# Patient Record
Sex: MALE | Race: WHITE
[De-identification: names, ages, dates, MRNs, and addresses within clinical notes are randomized per-mention and may not be internally consistent; named-entity substitution may affect disease eponyms.]

---

## 2019-06-10 ENCOUNTER — HOSPITAL ENCOUNTER (INPATIENT)
Dept: HOSPITAL 97 - ER | Age: 34
LOS: 2 days | Discharge: LEFT BEFORE DISCHARGE | DRG: 918 | End: 2019-06-12
Attending: FAMILY MEDICINE | Admitting: HOSPITALIST
Payer: SELF-PAY

## 2019-06-10 DIAGNOSIS — R51: ICD-10-CM

## 2019-06-10 DIAGNOSIS — R45.851: ICD-10-CM

## 2019-06-10 DIAGNOSIS — T42.1X2A: Primary | ICD-10-CM

## 2019-06-10 DIAGNOSIS — Z91.81: ICD-10-CM

## 2019-06-10 DIAGNOSIS — F20.9: ICD-10-CM

## 2019-06-10 DIAGNOSIS — Z91.14: ICD-10-CM

## 2019-06-10 DIAGNOSIS — F17.210: ICD-10-CM

## 2019-06-10 DIAGNOSIS — K21.9: ICD-10-CM
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
## 2019-06-10 LAB
ALBUMIN SERPL BCP-MCNC: 4.2 G/DL (ref 3.4–5)
ALP SERPL-CCNC: 65 U/L (ref 45–117)
ALT SERPL W P-5'-P-CCNC: 24 U/L (ref 12–78)
AST SERPL W P-5'-P-CCNC: 13 U/L (ref 15–37)
BUN BLD-MCNC: 17 MG/DL (ref 7–18)
GLUCOSE SERPLBLD-MCNC: 146 MG/DL (ref 74–106)
HCT VFR BLD CALC: 49.7 % (ref 39.6–49)
INR BLD: 1.04
LYMPHOCYTES # SPEC AUTO: 1.7 K/UL (ref 0.7–4.9)
METHAMPHET UR QL SCN: NEGATIVE
PMV BLD: 8.4 FL (ref 7.6–11.3)
POTASSIUM SERPL-SCNC: 3.9 MMOL/L (ref 3.5–5.1)
RBC # BLD: 5.96 M/UL (ref 4.33–5.43)
THC SERPL-MCNC: NEGATIVE NG/ML

## 2019-06-10 PROCEDURE — 99285 EMERGENCY DEPT VISIT HI MDM: CPT

## 2019-06-10 PROCEDURE — 80053 COMPREHEN METABOLIC PANEL: CPT

## 2019-06-10 PROCEDURE — 80076 HEPATIC FUNCTION PANEL: CPT

## 2019-06-10 PROCEDURE — 80156 ASSAY CARBAMAZEPINE TOTAL: CPT

## 2019-06-10 PROCEDURE — 85025 COMPLETE CBC W/AUTO DIFF WBC: CPT

## 2019-06-10 PROCEDURE — 84100 ASSAY OF PHOSPHORUS: CPT

## 2019-06-10 PROCEDURE — 96361 HYDRATE IV INFUSION ADD-ON: CPT

## 2019-06-10 PROCEDURE — 70450 CT HEAD/BRAIN W/O DYE: CPT

## 2019-06-10 PROCEDURE — 85730 THROMBOPLASTIN TIME PARTIAL: CPT

## 2019-06-10 PROCEDURE — 80320 DRUG SCREEN QUANTALCOHOLS: CPT

## 2019-06-10 PROCEDURE — 97116 GAIT TRAINING THERAPY: CPT

## 2019-06-10 PROCEDURE — 36415 COLL VENOUS BLD VENIPUNCTURE: CPT

## 2019-06-10 PROCEDURE — 80307 DRUG TEST PRSMV CHEM ANLYZR: CPT

## 2019-06-10 PROCEDURE — 80074 ACUTE HEPATITIS PANEL: CPT

## 2019-06-10 PROCEDURE — 85610 PROTHROMBIN TIME: CPT

## 2019-06-10 PROCEDURE — 72125 CT NECK SPINE W/O DYE: CPT

## 2019-06-10 PROCEDURE — 93005 ELECTROCARDIOGRAM TRACING: CPT

## 2019-06-10 PROCEDURE — 80048 BASIC METABOLIC PNL TOTAL CA: CPT

## 2019-06-10 PROCEDURE — 97161 PT EVAL LOW COMPLEX 20 MIN: CPT

## 2019-06-10 PROCEDURE — 81003 URINALYSIS AUTO W/O SCOPE: CPT

## 2019-06-10 PROCEDURE — 83735 ASSAY OF MAGNESIUM: CPT

## 2019-06-10 PROCEDURE — 87389 HIV-1 AG W/HIV-1&-2 AB AG IA: CPT

## 2019-06-10 PROCEDURE — 87522 HEPATITIS C REVRS TRNSCRPJ: CPT

## 2019-06-10 PROCEDURE — 80329 ANALGESICS NON-OPIOID 1 OR 2: CPT

## 2019-06-10 PROCEDURE — 97530 THERAPEUTIC ACTIVITIES: CPT

## 2019-06-10 PROCEDURE — 96374 THER/PROPH/DIAG INJ IV PUSH: CPT

## 2019-06-10 RX ADMIN — ACETAMINOPHEN PRN MG: 500 TABLET, FILM COATED ORAL at 19:29

## 2019-06-10 RX ADMIN — Medication SCH ML: at 21:00

## 2019-06-10 RX ADMIN — Medication SCH ML: at 10:04

## 2019-06-10 RX ADMIN — ENOXAPARIN SODIUM SCH MG: 40 INJECTION SUBCUTANEOUS at 10:03

## 2019-06-10 RX ADMIN — SODIUM CHLORIDE SCH: 0.45 INJECTION, SOLUTION INTRAVENOUS at 21:00

## 2019-06-10 RX ADMIN — SODIUM CHLORIDE SCH MLS: 0.45 INJECTION, SOLUTION INTRAVENOUS at 17:49

## 2019-06-10 NOTE — RAD REPORT
EXAM DESCRIPTION:  CT - Head Brain Wo Cont - 6/10/2019 6:14 am

 

CLINICAL HISTORY:  The patient is 33 years old and is Male; AMS. drug OD

 

TECHNIQUE:  Axial computed tomography images of the head/brain without intravenous contrast.   Sagitt
al and coronal reformatted images were created and reviewed.   This CT exam was performed using one o
r more of the following dose reduction techniques:   automated exposure control, adjustment of the mA
 and/or kV according to patient size, and/or use of iterative reconstruction technique.

 

COMPARISON:  No relevant prior studies available.

 

FINDINGS:  BRAIN:   Unremarkable.   The gray-white matter differentiation is preserved . No hemorrhag
e.   No significant white matter disease.   No edema. No extra-axial fluid collections.

   VENTRICLES:   Unremarkable.   No ventriculomegaly.

   BONES/JOINTS:   No acute fracture.

   SOFT TISSUES:   Unremarkable.

   SINUSES:   Unremarkable as visualized.   No acute sinusitis.

   MASTOID AIR CELLS:   Unremarkable as visualized.   No mastoid effusion.

 

IMPRESSION:  No acute intracranial findings.

 

Electronically signed by:   Clemencia Burton MD   6/10/2019 3:36 AM CDT Workstation: [id number]

 

 

Due to temporary technical issues with the PACS/Fluency reporting system, reports are being signed by
 the in house radiologist as a courtesy to ensure prompt reporting. The interpreting radiologist is f
ully responsible for the content of the report.

## 2019-06-10 NOTE — P.HP
Certification for Inpatient


Patient admitted to: Inpatient


With expected LOS: >2 Midnights


Patient will require the following post-hospital care: None


Practitioner: I am a practitioner with admitting privileges, knowledge of 

patient current condition, hospital course, and medical plan of care.


Services: Services provided to patient in accordance with Admission 

requirements found in Title 42 Section 412.3 of the Code of Federal Regulations





Patient History


Date of Service: 06/10/19


Reason for admission: Suicidal ideations


History of Present Illness: 





Patient is a 33-year-old gentleman who has been upset because apparently he got 

into an argument with his girlfriend.  Patient apparently took 43 Tegretol pills

, and he apparently was trying to hurt himself.  Patient at this time is not 

that communicative.  He does not rule a lot of information and he is lethargic.

  He is arousable.  But even when he arouses he just as for risk friend to come 

and talk to him.  Will admit him to the hospital for monitoring of his Tegretol 

level.  Will also needs psychiatric evaluation for inpatient psychiatry.





- Past Medical/Surgical History


Past Medical History: Unable to obtain


Past Surgical History: Unable to obtain





- Family History


  ** Father


Family History: Reviewed- Non-Contributory





- Social History


Smoking Status: Current some day smoker


Alcohol use: Yes


CD- Drugs: Yes





Review of Systems


10-point ROS is otherwise unremarkable





Physical Examination





- Vital Signs


Temperature: 99 F


Blood Pressure: 130/80


Pulse: 78


Respirations: 18


Pulse Ox (%): 99





- Physical Exam


General: Alert, Other (Lethargic)


HEENT: Atraumatic, PERRLA, Mucous membr. moist/pink, EOMI, Sclerae nonicteric


Neck: Supple, 2+ carotid pulse no bruit, No LAD, Without JVD or thyroid 

abnormality


Respiratory: Clear to auscultation bilaterally, Normal air movement


Cardiovascular: Regular rate/rhythm, Normal S1 S2, No murmurs


Gastrointestinal: Normal bowel sounds, Soft and benign, Non-distended, No 

tenderness


Musculoskeletal: No clubbing, No swelling, No tenderness


Integumentary: No rashes


Neurological: Normal speech, Normal tone, Sensation intact, Cranial nerves 3-12 

intact, Normal affect, Abnormal gait, Abnormal strength


Lymphatics: No axilla or inguinal lymphadenopathy





- Studies


Laboratory Data (last 24 hrs)





06/10/19 02:10: PT 12.2, INR 1.04, APTT 27.7


06/10/19 02:10: WBC 13.5 H, Hgb 16.7, Hct 49.7 H, Plt Count 351


06/10/19 02:10: Sodium 141, Potassium 3.9, BUN 17, Creatinine 1.17, Glucose 146 

H, Total Bilirubin 0.4, AST 13 L, ALT 24, Alkaline Phosphatase 65








Assessment & Plan





- Problems (Diagnosis)


(1) Suicidal ideation


Current Visit: Yes   Status: Acute   





(2) Tegretol toxicity


Current Visit: Yes   Status: Acute   





(3) Overdose of anticonvulsant


Current Visit: Yes   Status: Acute   





- Plan





PLAN:


1. Aggressive hydration


2. Suicidal precautions


3. Monitor electrolytes


4. Tegretol toxicity-check levels daily


5. Repeat labs in the evening


6. GI/DVT prophylaxis


Discharge Plan: Psychiatry


Plan to discharge in: Greater than 2 days





- Advance Directives


Does patient have a Living Will: No


Does patient have a Durable POA for Healthcare: No





- Code Status/Comfort Care


Code Status Assessed: Yes


Code Status: Full Code


Critical Care: No


Time Spent Managing PTS Care (In Minutes): 45

## 2019-06-10 NOTE — ER
Nurse's Notes                                                                                     

 [facility]                                                                 

Name: David Chery Sr                                                                              

Age: 33 yrs                                                                                       

Sex: Male                                                                                         

: 1985                                                                                   

MRN: [id number]                                                                                   

Arrival Date: 06/10/2019                                                                          

Time: 01:44                                                                                       

Account#: [id number]                                                                            

Bed 5                                                                                             

Private MD:                                                                                       

Diagnosis: Acute intentional drug overdose;Altered mental status due to tegretol overdose         

                                                                                                  

Presentation:                                                                                     

06/10                                                                                             

01:45 Presenting complaint: EMS states: Reports pt family called EMS, they reported he took   ea  

      approximately 43 carbamazepine pills. EMS report pt is A and O x 4 but unsteady on his      

      feet and groggy. Pt reports he took an unknown amount of carbamazepine pills Saturday       

      morning and started feeling "buzzed",  morning. Pt states "tonight I couldn't         

      even walk". Transition of care: patient was not received from another setting of care.      

      Onset of symptoms was Justina 10, 2019. Risk Assessment: Do you want to hurt yourself or       

      someone else? Patient reports no desire to harm self or others. Other: Pt reports he is     

      not sure if he was trying to hurt himself. Initial Sepsis Screen: Does the patient meet     

      any 2 criteria?. Initial Sepsis Screen: Does the patient have a suspected source of         

      infection? No. Patient's initial sepsis screen is negative. Care prior to arrival: None.    

01:45 Method Of Arrival: EMS: [facility]                                                         

01:45 Acuity: CHLOE 2                                                                           ea  

                                                                                                  

Historical:                                                                                       

- Allergies:                                                                                      

04:06 No Known Allergies;                                                                     ea  

- PMHx:                                                                                           

04:06 None;                                                                                   ea  

- PSHx:                                                                                           

04:06 None;                                                                                   ea  

                                                                                                  

- Immunization history:: Adult Immunizations up to date.                                          

- Social history:: Smoking status: unknown.                                                       

- Ebola Screening: : No symptoms or risks identified at this time.                                

                                                                                                  

                                                                                                  

Screenin:58 Abuse screen: Denies threats or abuse. Nutritional screening: No deficits noted.        ea  

      Tuberculosis screening: No symptoms or risk factors identified. Fall Risk IV access (20     

      points).                                                                                    

                                                                                                  

Assessment:                                                                                       

02:05 General: Appears in no apparent distress. Behavior is drowsy, restless. Pain: Denies    ea  

      pain. Neuro: Level of Consciousness is awake, alert, obeys commands, Oriented to            

      person, place, time, situation. Cardiovascular: Patient's skin is warm and dry.             

      Respiratory: Airway is patent Respiratory effort is even, unlabored, Respiratory            

      pattern is regular, symmetrical. GI: Abdomen is non-distended. : No signs and/or          

      symptoms were reported regarding the genitourinary system. EENT: No signs and/or            

      symptoms were reported regarding the EENT system. Derm: Skin is pink, warm \T\ dry.         

      Musculoskeletal: No signs and/or symptoms reported regarding the musculoskeletal system.    

03:01 Reassessment: Patient and/or family updated on plan of care and expected duration. Pain ea  

      level reassessed. Pt resting with eyes closed, respirations even and unlabored. Chest       

      expansions even and symmetrical. No s/s of pain or discomfort noted at this time.           

04:04 Reassessment: Patient and/or family updated on plan of care and expected duration. Pain ea  

      level reassessed. Pt resting with eyes closed, respirations even and unlabored, chest       

      expansions even and symmetrical. No s/s of pain or discomfort noted at this time.           

05:42 Reassessment: Pt resting with eyes closed, respirations even and unlabored. Chest       ea  

      expansions even and symmetrical. No s/s of pain or discomfort noted at this time.           

06:27 Reassessment: Spoke with wife who states that pt took "many" pills due to her leaving   fc  

      with the baby. Explained that pt was on suicide watch and would be going to ICU to be       

      monitored. She understands. Did get to see pt but instructed on to wake him. She will       

      contact pts mother and notify her of admission.                                             

07:14 Reassessment: [facility] ER personnel reports that the pt needs a mental health warrant,    sg  

      reports that the pt no longer wants to be in the hospital, awaiting the mental health       

      deputy/ to issue the mental health warrant.                                            

                                                                                                  

Overdose:                                                                                         

07:06 Patient took 43 tabs of Tegretol. Overdose occurred more than 10 hours ago.             sg  

                                                                                                  

Vital Signs:                                                                                      

02:00  / 102; Pulse 105; Resp 19; Temp 98.3; Pulse Ox 96% on R/A;                       ea  

03:03  / 65; Pulse 81; Resp 18; Pulse Ox 99% on R/A;                                    ea  

06:05  / 68; Pulse 72; Resp 12; Pulse Ox 99% on R/A;                                    tl2 

                                                                                                  

[place] Coma Score:                                                                               

04:30 Eye Response: to voice(3). Verbal Response: oriented(5). Motor Response: localizes      snw 

      pain(5). Total: 13.                                                                         

                                                                                                  

ED Course:                                                                                        

01:44 Patient arrived in ED.                                                                  ea  

01:45 Safety Checks: Personal items have been removed. The door is open or patient has been   ea  

      placed in a hallway bed/chair. There are no family/friend visitors at this time Sitter      

      present at this time.                                                                       

01:52 Yue Thompson FNP-C is PsychiatricP.                                                        snw 

01:53 Manan Looney MD is Attending Physician.                                             snw 

01:58 Triage completed.                                                                       ea  

02:00 Safety Checks: Personal items have been removed. The door is open or patient has been   ea  

      placed in a hallway bed/chair. There are no family/friend visitors at this time Sitter      

      present at this time.                                                                       

02:00 Patient has correct armband on for positive identification. Bed in low position. Call   ea  

      light in reach. Side rails up X2.                                                           

02:00 Arm band placed on right wrist. Patient placed in an exam room, on a stretcher, on      ea  

      pulse oximetry.                                                                             

02:00 Safety checks: Items removed: yes. Door open/sign placed on door: Patient placed in     ag4 

      hallway bed. Family/friend present: no. Sitter present: Yes.                                

02:01 Vicky Maharaj RN is Primary Nurse.                                                    ea  

02:01 Inserted saline lock: 18 gauge in right antecubital area, using aseptic technique.      ea  

      Blood collected. Inserted by Suresh AGUILA.                                                    

02:15 Safety Checks: Personal items have been removed. The door is open or patient has been   ea  

      placed in a hallway bed/chair. There are no family/friend visitors at this time Sitter      

      present at this time.                                                                       

02:15 Safety checks: Items removed: yes. Door open/sign placed on door: Patient placed in     ag4 

      hallway bed. Family/friend present: no. Sitter present: Yes.                                

02:30 Safety Checks: Personal items have been removed. The door is open or patient has been   ea  

      placed in a hallway bed/chair. There are no family/friend visitors at this time Sitter      

      present at this time.                                                                       

02:30 Safety checks: Items removed: yes. Door open/sign placed on door: Patient placed in     ag4 

      hallway bed. Family/friend present: no. Sitter present: Yes.                                

02:45 Safety checks: Items removed: yes. Door open/sign placed on door: Patient placed in     ag4 

      hallway bed. Family/friend present: no. Sitter present: Yes.                                

03:00 Safety checks: Items removed: yes. Door open/sign placed on door: Patient placed in     ag4 

      hallway bed. Family/friend present: no. Sitter present: Yes.                                

03:15 Safety checks: Items removed: yes. Door open/sign placed on door: Patient placed in     ag4 

      hallway bed. Family/friend present: no. Sitter present: Yes.                                

03:30 Safety checks: Items removed: yes. Door open/sign placed on door: Patient placed in     ag4 

      hallway bed. Family/friend present: no. Sitter present: Yes.                                

03:32 CT Head Brain wo Cont In Process Unspecified.                                           EDMS

03:45 Safety checks: Items removed: yes. Door open/sign placed on door: Patient placed in     ag4 

      hallway bed. Family/friend present: no. Sitter present: Yes.                                

04:00 Safety checks: Items removed: yes. Door open/sign placed on door: Patient placed in     ag4 

      hallway bed. Family/friend present: no. Sitter present: Yes.                                

04:15 Safety checks: Items removed: yes. Door open/sign placed on door: Patient placed in     ag4 

      hallway bed. Family/friend present: no. Sitter present: Yes.                                

04:30 Safety checks: Items removed: yes. Door open/sign placed on door: Patient placed in     ag4 

      hallway bed. Family/friend present: no. Sitter present: Yes.                                

04:45 Safety checks: Items removed: yes. Door open/sign placed on door: Patient placed in     ag4 

      hallway bed. Family/friend present: no. Sitter present: Yes.                                

05:00 Safety checks: Items removed: yes. Door open/sign placed on door: Patient placed in     ag4 

      hallway bed. Family/friend present: no. Sitter present: Yes.                                

05:49 Mart Huggins MD is Hospitalizing Provider.                                           wa  

07:00 Safety checks: Items removed: yes. Door open/sign placed on door: yes. Family/friend    ms  

      present: no. Sitter present: Yes.                                                           

07:15 Safety checks: Items removed: yes. Door open/sign placed on door: yes. Family/friend    ms  

      present: no. Sitter present: Yes.                                                           

07:28 [facility] evaluator in room with PT.                                                   ms  

07:30 Safety checks: Items removed: yes. Door open/sign placed on door: yes. Family/friend    ms  

      present: no. Sitter present: Yes.                                                           

07:45 Safety checks: Items removed: yes. Door open/sign placed on door: yes. Family/friend    ms  

      present: no. Sitter present: Yes.                                                           

07:49 Diet: Patient given a regular meal tray.                                                ms  

08:00 Safety checks: Items removed: yes. Door open/sign placed on door: yes. Family/friend    ms  

      present: no. Sitter present: Yes.                                                           

08:15 Safety checks: Items removed: yes. Door open/sign placed on door: yes. Family/friend    ms  

      present: no. Sitter present: Yes.                                                           

08:30 Safety checks: Items removed: yes. Door open/sign placed on door: yes. Family/friend    ms  

      present: no. Sitter present: Yes.                                                           

08:45 Safety checks: Items removed: yes. Door open/sign placed on door: yes. Family/friend    ms  

      present: no. Sitter present: Yes.                                                           

09:00 Safety checks: Items removed: yes. Door open/sign placed on door: yes. Family/friend    ms  

      present: no. Sitter present: Yes.                                                           

09:16 Patient admitted, IV remains in place. intact, No redness/swelling at site.             sg  

                                                                                                  

Administered Medications:                                                                         

02:19 Drug: NS 0.9% 1000 ml Route: IV; Rate: 1 bolus; Site: right antecubital;                ea  

03:30 Follow up: Response: No adverse reaction; IV Status: Completed infusion; IV Intake:     ea  

      1000ml                                                                                      

02:30 Drug: COgentin 2 mg Route: IVP; Site: right antecubital;                                ea  

03:00 Follow up: Response: No adverse reaction                                                ea  

                                                                                                  

                                                                                                  

Intake:                                                                                           

03:30 IV: 1000ml; Total: 1000ml.                                                              ea  

                                                                                                  

Outcome:                                                                                          

05:52 Decision to Hospitalize by Provider.                                                    wa  

09:15 Admitted to ICU accompanied by nurse, via stretcher, room 7, on monitor, with chart,    sg  

      Report called to  MINGO Slaughter                                                             

09:15 Condition: stable                                                                           

09:15 Instructed on follow up and referral plans. safety practices, Demonstrated                  

      understanding of instructions.                                                              

09:56 Patient left the ED.                                                                    iw  

                                                                                                  

Signatures:                                                                                       

Dispatcher [employer]                           Ronnie Del Toro RN                         RN   sg                                                   

Yue Thompson, FNP-C                 FNP-Csnw                                                  

America Cruz, RN                   RN   Zabrina Morillo RN RN iw Solis, Maria                                 ms                                                   

Ghassan, Ela, MINGO                        RN   tl2                                                  

Vicky Maharaj RN RN                                                      

Manan Looney MD MD wa Guzman, Jacinto                                 ag4                                                  

                                                                                                  

Corrections: (The following items were deleted from the chart)                                    

02:02 01:58 Fall Risk None identified. [facility]  

02:03 02:00  / 102; Pulse 105bpm; Resp 19bpm; Pulse Ox 100%; Temp 98.3F; [facility]  

07:47 07:28 [facility] evaluator in room with PTP ms                                          ms  

                                                                                                  

**************************************************************************************************

## 2019-06-10 NOTE — EKG
Test Date:    2019-06-10               Test Time:    01:54:23

Technician:   AG3                                    

                                                     

MEASUREMENT RESULTS:                                       

Intervals:                                           

Rate:         90                                     

NM:           146                                    

QRSD:         78                                     

QT:           346                                    

QTc:          423                                    

Axis:                                                

P:            58                                     

NM:           146                                    

QRS:          51                                     

T:            48                                     

                                                     

INTERPRETIVE STATEMENTS:                                       

                                                     

Normal sinus rhythm

Normal ECG

Compared to ECG 06/21/2014 00:12:24

Sinus tachycardia no longer present



Electronically Signed On 06-10-19 09:53:37 CDT by Claudy Ulloa

## 2019-06-10 NOTE — EDPHYS
Physician Documentation                                                                           

 [facility]                                                                 

Name: David Chery Sr                                                                              

Age: 33 yrs                                                                                       

Sex: Male                                                                                         

: 1985                                                                                   

MRN: [id number]                                                                                   

Arrival Date: 06/10/2019                                                                          

Time: 01:44                                                                                       

Account#: [id number]                                                                            

Bed 5                                                                                             

Private MD:                                                                                       

ED Physician Manan Looney                                                                      

HPI:                                                                                              

06/10                                                                                             

02:01 This 33 yrs old  Male presents to ER via EMS with complaints of Overdose.      snw 

02:01 Context: Method: the patient has a confirmed or suspected ingestion, Tegretol, Time:    snw 

      the patient's OD/poisoning occurred at an unknown time, Extent: "a lot", the                

      OD/poisoning occurred at at home, Psychiatric history: the patient has a known              

      psychiatric disorder, depression, Previous OD/poisoning history: yes, 3 year(s) ago.        

      Associated signs and symptoms: Pertinent positives: fall x 4 episodes. Severity of          

      symptoms: At their worst the symptoms were moderate. The patient has experienced a          

      previous episode. It is unknown whether or not the patient has recently seen a              

      physician. Pt got into an argument with girlfriend and took "a lot" of tegretol.            

                                                                                                  

Historical:                                                                                       

- Allergies:                                                                                      

04:06 No Known Allergies;                                                                     ea  

- PMHx:                                                                                           

04:06 None;                                                                                   ea  

- PSHx:                                                                                           

04:06 None;                                                                                   ea  

                                                                                                  

- Immunization history:: Adult Immunizations up to date.                                          

- Social history:: Smoking status: unknown.                                                       

- Ebola Screening: : No symptoms or risks identified at this time.                                

                                                                                                  

                                                                                                  

ROS:                                                                                              

02:00 Constitutional: Negative for fever, chills, and weight loss, Eyes: Negative for injury, snw 

      pain, redness, and discharge, ENT: Negative for injury, pain, and discharge, Neck:          

      Negative for injury, pain, and swelling, Cardiovascular: Negative for chest pain,           

      palpitations, and edema, Respiratory: Negative for shortness of breath, cough,              

      wheezing, and pleuritic chest pain, Abdomen/GI: Negative for abdominal pain, nausea,        

      vomiting, diarrhea, and constipation, Back: Negative for injury and pain, : Negative      

      for injury, bleeding, discharge, and swelling, MS/Extremity: Negative for injury and        

      deformity.                                                                                  

02:00 Neuro: Negative for headache, weakness, numbness, tingling, and seizure.                    

02:00 Skin: Positive for abrasion(s).                                                             

02:00 Psych: Positive for suicide gesture, "because I was mad".                                   

05:25 All other systems are negative.                                                         wa  

                                                                                                  

Exam:                                                                                             

01:57 ENT:  Nares patent. No nasal discharge, no septal abnormalities noted.  Tympanic        snw 

      membranes are normal and external auditory canals are clear.  Oropharynx with no            

      redness, swelling, or masses, exudates, or evidence of obstruction, uvula midline.          

      Mucous membranes moist. Neck:  Trachea midline, no thyromegaly or masses palpated, and      

      no cervical lymphadenopathy.  Supple, full range of motion without nuchal rigidity, or      

      vertebral point tenderness.  No Meningismus. Chest/axilla:  Normal chest wall               

      appearance and motion.  Nontender with no deformity.  No lesions are appreciated.           

01:57 Respiratory:  Lungs have equal breath sounds bilaterally, clear to auscultation and         

      percussion.  No rales, rhonchi or wheezes noted.  No increased work of breathing, no        

      retractions or nasal flaring. Abdomen/GI:  Soft, non-tender, with normal bowel sounds.      

      No distension or tympany.  No guarding or rebound.  No evidence of tenderness               

      throughout. Back:  No spinal tenderness.  No costovertebral tenderness.  Full range of      

      motion.                                                                                     

01:57 Constitutional: The patient appears lethargic.                                              

01:57 Head/face: Noted is contusion, that is superficial, of the  left eye.                       

01:57 Eyes: Pupils: dilated, bilaterally, equal, Extraocular movements: unable to assess.         

01:57 Cardiovascular: Rate: tachycardic, Rhythm: regular, Pulses: no pulse deficits are           

      appreciated.                                                                                

01:57 Skin: injury, abrasion(s), small abrasion noted, of the left eye and abdomen.               

01:57 Neuro: Orientation: to person, place, time, situation, Mentation: responsive to voice       

      seizure activity, is not displayed by the patient.                                          

01:57 Psych: Patient having thoughts of suicide. Plan for suicide is  overdose on Tegretol        

                                                                                                  

Vital Signs:                                                                                      

02:00  / 102; Pulse 105; Resp 19; Temp 98.3; Pulse Ox 96% on R/A;                       ea  

03:03  / 65; Pulse 81; Resp 18; Pulse Ox 99% on R/A;                                    ea  

06:05  / 68; Pulse 72; Resp 12; Pulse Ox 99% on R/A;                                    tl2 

                                                                                                  

Marie Coma Score:                                                                               

04:30 Eye Response: to voice(3). Verbal Response: oriented(5). Motor Response: localizes      snw 

      pain(5). Total: 13.                                                                         

                                                                                                  

MDM:                                                                                              

02:06 Patient medically screened.                                                             wa  

04:29 Other consultation: Poison control, at 04:20, supportive care and IV fluids.            snw 

05:37 Differential diagnosis: Ingestion/exposure to tegretol with intent to harm self. Data   wa  

      reviewed: vital signs, nurses notes. Test interpretation: by ED physician or midlevel       

      provider:.                                                                                  

05:41 Test interpretation: by ED physician or midlevel provider: APAP negative. ASA negative. wa  

      hyperglycemia at 146. leukocytosis at 13.5. UDS and ETOH negative. carbamazepine 33.1       

      Head CT negative. Response to treatment: the patient's symptoms have markedly improved      

      after treatment. Physician consultation: Mart Huggins MD.                                  

05:47 ED course: spoke with poison control regarding OD. advised fluids and observation.      wa  

      monitor for worsening.                                                                      

05:48 Test interpretation: by ED physician or midlevel provider: HR 90. sinus. nml axis. nml  wa  

      QRS. normal intervals. ST-T wnl.                                                            

05:53 ED course: received cogentin for non-purposeful movements consistent with myoclonus vs  wa  

      dystonia.                                                                                   

                                                                                                  

06/10                                                                                             

01:47 Order name: Acetaminophen                                                               sn 

06/10                                                                                             

01:47 Order name: Basic Metabolic Panel                                                       snw 

06/10                                                                                             

01:47 Order name: CBC with Diff                                                               snw 

06/10                                                                                             

01:47 Order name: ETOH Level                                                                  snw 

06/10                                                                                             

01:47 Order name: Hepatic Function                                                            snw 

06/10                                                                                             

01:47 Order name: PT-INR                                                                      snw 

06/10                                                                                             

01:47 Order name: Ptt, Activated                                                              snw 

06/10                                                                                             

01:47 Order name: Salicylate                                                                  sn 

06/10                                                                                             

01:47 Order name: Urine Drug Screen; Complete Time: 03:29                                     snw 

06/10                                                                                             

01:48 Order name: Acetaminophen Level; Complete Time: 04:14                                   EDMS

06/10                                                                                             

01:48 Order name: Basic Metabolic Panel; Complete Time: 04:14                                 EDMS

06/10                                                                                             

01:49 Order name: CBC with Automated Diff; Complete Time: 03:29                               EDMS

06/10                                                                                             

01:49 Order name: Alcohol Serum/Plasma; Complete Time: 03:29                                  EDMS

06/10                                                                                             

01:47 Order name: EKG; Complete Time: 01:50                                                   snw 

06/10                                                                                             

01:47 Order name: EKG - Nurse/Tech; Complete Time: 02:31                                      snw 

06/10                                                                                             

01:47 Order name: IV Saline Lock; Complete Time: 02:31                                        snw 

06/10                                                                                             

01:47 Order name: Labs collected and sent; Complete Time: 02:31                               snw 

06/10                                                                                             

01:49 Order name: Liver (Hepatic) Function; Complete Time: 04:14                              EDMS

06/10                                                                                             

01:49 Order name: Protime (+INR); Complete Time: 03:29                                        EDMS

06/10                                                                                             

01:49 Order name: PTT, Activated Partial Thromb; Complete Time: 03:29                         EDMS

06/10                                                                                             

01:49 Order name: Salicylates Level; Complete Time: 03:29                                     EDMS

06/10                                                                                             

02:22 Order name: CT Head Brain wo Cont                                                       wa  

06/10                                                                                             

02:27 Order name: Urine Dipstick--Ancillary (enter results)                                   cm6 

06/10                                                                                             

02:43 Order name: Carbamazepine (Tegretol) Level; Complete Time: 04:14                        EDMS

06/10                                                                                             

04:22 Order name: EKG; Complete Time: 04:23                                                   snw 

06/10                                                                                             

05:47 Order name: Case Management Consult                                                     EDMS

06/10                                                                                             

05:47 Order name: Regular                                                                     EDMS

06/10                                                                                             

01:47 Order name: Urine Dipstick-Ancillary (obtain specimen); Complete Time: 02:07            snw 

06/10                                                                                             

01:57 Order name: O2; Complete Time: 02:30                                                    snw 

                                                                                                  

Administered Medications:                                                                         

02:19 Drug: NS 0.9% 1000 ml Route: IV; Rate: 1 bolus; Site: right antecubital;                ea  

03:30 Follow up: Response: No adverse reaction; IV Status: Completed infusion; IV Intake:     ea  

      1000ml                                                                                      

02:30 Drug: COgentin 2 mg Route: IVP; Site: right antecubital;                                ea  

03:00 Follow up: Response: No adverse reaction                                                ea  

                                                                                                  

                                                                                                  

Disposition:                                                                                      

05:49 Co-signature as Attending Physician, Manan Looney MD.                                 wa  

                                                                                                  

Disposition:                                                                                      

06/10/19 05:52 Hospitalization ordered by Mart Huggins for Observation. Preliminary             

  diagnosis are Acute intentional drug overdose, Altered mental status due to                     

  tegretol overdose.                                                                              

- Bed requested for Intensive Care Unit.                                                          

- Status is Observation.                                                                      iw  

- Condition is Stable.                                                                            

- Problem is new.                                                                                 

- Symptoms have improved.                                                                         

UTI on Admission? No                                                                              

                                                                                                  

                                                                                                  

                                                                                                  

Signatures:                                                                                       

Dispatcher [facility]                                                 

Marques, Brenda, RN                        RN   Yue Rodriguez FNP-C                 FNP-Csnw                                                  

Zabrina Linares, RN                     MING[place]   iw                                                   

Vicky Maharaj, RN                      Manan Fang ea, MD MD wa                                                   

                                                                                                  

Corrections: (The following items were deleted from the chart)                                    

02:42 01:50 CARBAMAZEPINE (TEGRETOL)+C.LAB.BRZ ordered. EDMS                                  EDMS

05:53 05:52 Hospitalization Ordered by Mart Huggins MD for Observation. Preliminary          mw  

      diagnosis is Acute intentional drug overdose; Altered mental status due to tegretol         

      overdose. Bed requested for Telemetry/MedSurg (observation). Status is Observation.         

      Condition is Stable. Problem is new. Symptoms have improved. UTI on Admission? No. wa       

09:56 05:53 06/10/2019 05:52 Hospitalization Ordered by Mart Huggins MD for Observation.     iw  

      Preliminary diagnosis is Acute intentional drug overdose; Altered mental status due to      

      tegretol overdose. Bed requested for Intensive Care Unit. Status is Observation.            

      Condition is Stable. Problem is new. Symptoms have improved. UTI on Admission? No. mw       

                                                                                                  

**************************************************************************************************

## 2019-06-10 NOTE — P.PN
Subjective


Date of Service: 06/10/19


Primary Care Provider: AIDEN


Chief Complaint: Suicidal ideations


Subjective: Doing well (Patient doing well this time.  Patient admits 

overdosing on medication.  Patient desires to leave.)





Physical Examination





- Vital Signs


Temperature: 99 F


Blood Pressure: 130/80


Pulse: 78


Respirations: 18


Pulse Ox (%): 99





- Physical Exam


General: Alert, In no apparent distress, Cooperative


HEENT: Atraumatic


Neck: Supple


Respiratory: Clear to auscultation bilaterally, Normal air movement


Cardiovascular: Normal pulses, Regular rate/rhythm


Gastrointestinal: Normal bowel sounds, Soft and benign, Non-distended


Integumentary: No erythema, No warmth, No cyanosis


Neurological: Normal speech, Normal strength at 5/5 x4 extr, Normal tone, 

Abnormal affect (Increased anxiety noted)





- Studies


Laboratory Data (last 24 hrs)





06/10/19 02:10: PT 12.2, INR 1.04, APTT 27.7


06/10/19 02:10: WBC 13.5 H, Hgb 16.7, Hct 49.7 H, Plt Count 351


06/10/19 02:10: Sodium 141, Potassium 3.9, BUN 17, Creatinine 1.17, Glucose 146 

H, Total Bilirubin 0.4, AST 13 L, ALT 24, Alkaline Phosphatase 65








Assessment & Plan


Discharge Plan: Psychiatry


Plan to discharge in: 24 Hours (To 48 hr)


Physician Review Additional Text: 





Impression:


Tegretol overdose with suicidal ideation complicated with history of 

schizophrenia, noncompliance with medication


Tobacco abuse





Plan:


Tegretol overdose with suicidal ideation complicated with history of 

schizophrenia, noncompliance with medication


Patient admitted to ICU for 1 on 1 care due to suicide ideation.  Case 

discussed with [facility] who has evaluated patient.  Both [facility] and myself agree that 

the patient should require psychiatric inpatient treatment once medically 

stable.  Tegretol level elevated.  Poison control has been called.  Continue 

monitor Tegretol levels daily.  Continue monitor lab closely.  Continue IV 

fluids.  Patient likely with noncompliance of his medication and follow up with 

psychiatry.  Patient in danger of leaving facility with still suicide ideation.

  Patient now has [employer] warrant to keep the patient hospitalized.  Will 

monitor closely.  Suicide prevention and place.  Anticipate improvement likely 

by tomorrow.  If clinically stable will medically clear to pursue inpatient 

psychiatric placement.


Tobacco abuse:  Will provide nicotine patch.


Time Spent Managing Pts Care (In Minutes): 55

## 2019-06-11 LAB
ALBUMIN SERPL BCP-MCNC: 3.3 G/DL (ref 3.4–5)
ALP SERPL-CCNC: 60 U/L (ref 45–117)
ALT SERPL W P-5'-P-CCNC: 21 U/L (ref 12–78)
AST SERPL W P-5'-P-CCNC: 10 U/L (ref 15–37)
BUN BLD-MCNC: 13 MG/DL (ref 7–18)
GLUCOSE SERPLBLD-MCNC: 133 MG/DL (ref 74–106)
HCT VFR BLD CALC: 44.6 % (ref 39.6–49)
INR BLD: 0.89
LYMPHOCYTES # SPEC AUTO: 3.5 K/UL (ref 0.7–4.9)
MAGNESIUM SERPL-MCNC: 2.1 MG/DL (ref 1.8–2.4)
PMV BLD: 8.2 FL (ref 7.6–11.3)
POTASSIUM SERPL-SCNC: 4 MMOL/L (ref 3.5–5.1)
RBC # BLD: 5.26 M/UL (ref 4.33–5.43)

## 2019-06-11 RX ADMIN — POISON ADSORBENT SCH: 50 SUSPENSION ORAL at 20:00

## 2019-06-11 RX ADMIN — POISON ADSORBENT SCH: 50 SUSPENSION ORAL at 21:00

## 2019-06-11 RX ADMIN — FAMOTIDINE SCH MG: 20 TABLET, FILM COATED ORAL at 21:51

## 2019-06-11 RX ADMIN — NICOTINE SCH MG: 21 PATCH TRANSDERMAL at 09:25

## 2019-06-11 RX ADMIN — Medication SCH: at 09:00

## 2019-06-11 RX ADMIN — SODIUM CHLORIDE SCH MLS: 0.45 INJECTION, SOLUTION INTRAVENOUS at 11:40

## 2019-06-11 RX ADMIN — ENOXAPARIN SODIUM SCH MG: 40 INJECTION SUBCUTANEOUS at 09:25

## 2019-06-11 RX ADMIN — SODIUM CHLORIDE SCH MLS: 0.45 INJECTION, SOLUTION INTRAVENOUS at 01:33

## 2019-06-11 RX ADMIN — FAMOTIDINE SCH MG: 20 TABLET, FILM COATED ORAL at 11:40

## 2019-06-11 RX ADMIN — Medication SCH ML: at 21:52

## 2019-06-11 NOTE — RAD REPORT
EXAM DESCRIPTION:  CT - C Spine Wo Con - 6/11/2019 12:17 pm

 

CLINICAL HISTORY:  Weakness, dizziness, blurred vision

 

COMPARISON:  None.

 

TECHNIQUE:  Axial 2 mm thick images of the cervical spine were obtained with sagittal and coronal rec
onstruction images generated and reviewed.

 

All CT scans are performed using dose optimization technique as appropriate and may include automated
 exposure control or mA/KV adjustment according to patient size.

 

FINDINGS:  Cervical bodies are normal in height. C2- T2 are normal in alignment. The occipital condyl
es are normally positioned relative to the C1 arch. C1 arch is normally positioned to the articular s
urfaces of the C2 body. Mastoid air cells are clear. No skullbase fracture. No tonsillar ectopia. No 
disk space narrowing. No fracture or acute bony abnormality.

 

No paraspinal mass or hematoma.

 

Central canal detail is inherently limited on CT imaging.

 

No mass, hematoma or suspicious soft tissue finding.

 

IMPRESSION:  Negative CT cervical spine examination.

## 2019-06-11 NOTE — RAD REPORT
EXAM DESCRIPTION:  CT - Head Brain Wo Cont - 6/11/2019 12:17 pm

 

CLINICAL HISTORY:  Weakness, dizziness, blurred vision

 

COMPARISON:  CT study Justina 10, 2019

 

TECHNIQUE:  Axial 5 mm thick images of the head were obtained without IV contrast.

 

All CT scans are performed using dose optimization technique as appropriate and may include automated
 exposure control or mA/KV adjustment according to patient size.

 

FINDINGS:  No intracranial hemorrhage, mass, edema or shift of mid-line structures. No acute infarcti
on changes seen. No abnormal extra-axial fluid collections. No cortical edema or sulcal effacement. V
entricles are normal. No evidence for an sella or supra sella abnormality. No gross globe or orbital 
content abnormality seen.

 

Mastoid air cells and visualized portions of the paranasal sinuses are clear.

 

No acute bony findings.

 

 

IMPRESSION:  No acute intracranial finding. No significant change from prior day imaging.

## 2019-06-12 LAB
ALBUMIN SERPL BCP-MCNC: 3.5 G/DL (ref 3.4–5)
ALP SERPL-CCNC: 60 U/L (ref 45–117)
ALT SERPL W P-5'-P-CCNC: 20 U/L (ref 12–78)
AST SERPL W P-5'-P-CCNC: 10 U/L (ref 15–37)
BUN BLD-MCNC: 10 MG/DL (ref 7–18)
GLUCOSE SERPLBLD-MCNC: 102 MG/DL (ref 74–106)
HCT VFR BLD CALC: 47 % (ref 39.6–49)
LYMPHOCYTES # SPEC AUTO: 3.5 K/UL (ref 0.7–4.9)
MAGNESIUM SERPL-MCNC: 2.1 MG/DL (ref 1.8–2.4)
PMV BLD: 8.1 FL (ref 7.6–11.3)
POTASSIUM SERPL-SCNC: 4.2 MMOL/L (ref 3.5–5.1)
RBC # BLD: 5.6 M/UL (ref 4.33–5.43)

## 2019-06-12 RX ADMIN — Medication SCH ML: at 08:30

## 2019-06-12 RX ADMIN — ENOXAPARIN SODIUM SCH MG: 40 INJECTION SUBCUTANEOUS at 08:29

## 2019-06-12 RX ADMIN — ACETAMINOPHEN PRN MG: 500 TABLET, FILM COATED ORAL at 06:43

## 2019-06-12 RX ADMIN — NICOTINE SCH MG: 21 PATCH TRANSDERMAL at 08:29

## 2019-06-12 RX ADMIN — FAMOTIDINE SCH MG: 20 TABLET, FILM COATED ORAL at 08:29

## 2019-06-12 RX ADMIN — POISON ADSORBENT SCH: 50 SUSPENSION ORAL at 01:00

## 2019-06-12 NOTE — P.PN
Subjective


Date of Service: 06/12/19


Primary Care Provider: AIDEN


Chief Complaint: Suicidal ideations


Subjective: Doing well (Patient doing well this time.  No significant 

complaints.  Patient willing to go to inpatient psychiatric facility.)





Physical Examination





- Vital Signs


Temperature: 97.5 F


Blood Pressure: 139/92


Pulse: 87


Respirations: 20


Pulse Ox (%): 100





- Physical Exam


General: Alert, In no apparent distress, Oriented x3, Cooperative


HEENT: Atraumatic


Neck: Supple


Respiratory: Clear to auscultation bilaterally, Normal air movement


Cardiovascular: Normal pulses, Regular rate/rhythm


Gastrointestinal: Normal bowel sounds, Soft and benign, Non-distended, No masses

, No rebound, No guarding


Neurological: Normal speech, Normal strength at 5/5 x4 extr, Normal tone, 

Normal affect





- Studies


Medications List Reviewed: Yes





Assessment & Plan


Discharge Plan: Psychiatry


Plan to discharge in: 24 Hours


Physician Review Additional Text: 





Impression:


Tegretol overdose with suicidal ideation complicated with history of 

schizophrenia, noncompliance with medication


Tobacco abuse


Recent fall


GERD





Plan:


Tegretol overdose with suicidal ideation complicated with history of 

schizophrenia, noncompliance with medication


Patient has remained stable.  Tegretol level now within normal range.  Patient 

did receive activated charcoal yesterday.  Patient was evaluated by [facility] the 

other day.  They recommended inpatient psychiatric treatment.  Patient 

medically stable and cleared at this time.  Will pursue inpatient psychiatric 

transfer.  Will arrange for transfer. 


Tobacco abuse:  Will provide nicotine patch if required.


Recent fall:  CT scan of the neck and head unremarkable.


GERD:  Will continue with Pepcid.


Time Spent Managing Pts Care (In Minutes): 55

## 2019-06-12 NOTE — P.DS
Admission Date: 06/10/19


Discharge Date: 19


Primary Care Provider: AIDEN


Disposition: AMA-LEFT AGAINST MEDICAL ADVIC


Discharge Condition: GOOD


Reason for Admission: Suicidal ideations


[facility]


Procedures: 





CT scan: 


COMPARISON:  CT study Justina 10, 2019 


TECHNIQUE:  Axial 5 mm thick images of the head were obtained without IV 

contrast. 


All CT scans are performed using dose optimization technique as appropriate and 

may include automated exposure control or mA/KV adjustment according to patient 

size. 


FINDINGS:  No intracranial hemorrhage, mass, edema or shift of mid-line 

structures. No acute infarction changes seen. No abnormal extra-axial fluid 

collections. No cortical edema or sulcal effacement. Ventricles are normal. No 

evidence for an sella or supra sella abnormality. No gross globe or orbital 

content abnormality seen. 


Mastoid air cells and visualized portions of the paranasal sinuses are clear. 


No acute bony findings. 


IMPRESSION:  No acute intracranial finding. No significant change from prior 

day imaging.





CT Neck: 


COMPARISON:  None. 


TECHNIQUE:  Axial 2 mm thick images of the cervical spine were obtained with 

sagittal and coronal reconstruction images generated and reviewed. 


All CT scans are performed using dose optimization technique as appropriate and 

may include automated exposure control or mA/KV adjustment according to patient 

size. 


FINDINGS:  Cervical bodies are normal in height. C2- T2 are normal in 

alignment. The occipital condyles are normally positioned relative to the C1 

arch. C1 arch is normally positioned to the articular surfaces of the C2 body. 

Mastoid air cells are clear. No skullbase fracture. No tonsillar ectopia. No 

disk space narrowing. No fracture or acute bony abnormality. 


No paraspinal mass or hematoma. 


Central canal detail is inherently limited on CT imaging. 


No mass, hematoma or suspicious soft tissue finding. 


IMPRESSION:  Negative CT cervical spine examination.





Medical Problem List: 


Tegretol overdose with suicidal ideation complicated with history of 

schizophrenia, noncompliance with medication


Tobacco abuse


Recent fall


GERD





Brief History of Present Illness: 





33-year-old  male with history of schizophrenia presented to the 

emergency room after taking 43 pills of Tegretol.  Patient was apparently upset 

with girlfriend.  Patient did this intentionally to hurt himself.  Patient was 

admitted for further treatment.  Initial Tegretol level was elevated.


Hospital Course: 





Patient presented with Tegretol overdose with suicidal ideation complicated 

with history of schizophrenia and noncompliance with medication.  Tegretol 

level was elevated.  Poison control was called.  They recommended to continue 

to monitor Tegretol levels until they normalized.  Patient received IV fluids 

with improvement.  Patient did receive activated charcoal as well as 

recommended by poison control.  Patient was evaluated by MR initially upon 

admission.  They agreed that the patient needed to be hospitalized and would 

require inpatient psychiatric transfer once medically stable.  The patient 

became very belligerent initially in the ICU.  Patient required snf 

warrant to keep him in the hospital to receive treatment.  His Tegretol did 

normalized.  Initiation of transfer to inpatient psychiatric facility was 

conducted.  1 inpatient facility denied his admission.  The patient was aware 

of this response and he became very agitated.  MR was called to reassess the 

patient.  Before MR could come to reassess the patient, the patient left 

against medical advice as his snf warrant had . 


Vital Signs/Physical Exam: 














Temp Pulse Resp BP Pulse Ox


 


 97.5 F   72   19   138/90   98 


 


 19 12:53  19 15:00  19 15:00  19 15:00  19 15:00








General: Alert


HEENT: Atraumatic


Cardiovascular: Normal pulses, Regular rate/rhythm


Neurological: Abnormal affect (Patient with increased anxiety)


Laboratory Data at Discharge: 














WBC  10.4 K/uL (4.3-10.9)   19  05:13    


 


Hgb  15.6 g/dL (13.6-17.9)   19  05:13    


 


Hct  47.0 % (39.6-49.0)   19  05:13    


 


Plt Count  294 K/uL (152-406)   19  05:13    


 


PT  10.5 SECONDS (9.5-12.5)   19  04:50    


 


INR  0.89   19  04:50    


 


APTT  27.3 SECONDS (24.3-36.9)   19  04:50    


 


Sodium  141 mmol/L (136-145)   19  05:13    


 


Potassium  4.2 mmol/L (3.5-5.1)   19  05:13    


 


BUN  10 mg/dL (7-18)   19  05:13    


 


Creatinine  1.07 mg/dL (0.55-1.3)   19  05:13    


 


Glucose  102 mg/dL ()   19  05:13    


 


Phosphorus  4.0 mg/dL (2.5-4.9)   19  04:50    


 


Magnesium  2.1 mg/dL (1.8-2.4)   19  05:13    


 


Total Bilirubin  0.2 mg/dL (0.2-1.0)   19  05:13    


 


AST  10 U/L (15-37)  L  19  05:13    


 


ALT  20 U/L (12-78)   19  05:13    


 


Alkaline Phosphatase  60 U/L ()   19  05:13    








Home Medications: 








NK [No Home Meds]  06/10/19 





Patient Discharge Instructions: Patient left against medical advice.


Diet: AHA


Activity: Ad viviane


Time spent managing pt's care (in minutes): 55

## 2019-06-13 LAB — HCV RNA SPEC NAA+PROBE-LOG#: 5.96 LOG IU/ML

## 2021-06-17 ENCOUNTER — HOSPITAL ENCOUNTER (EMERGENCY)
Dept: HOSPITAL 97 - ER | Age: 36
Discharge: HOME | End: 2021-06-17
Payer: SELF-PAY

## 2021-06-17 VITALS — TEMPERATURE: 97.7 F

## 2021-06-17 VITALS — OXYGEN SATURATION: 95 %

## 2021-06-17 VITALS — DIASTOLIC BLOOD PRESSURE: 95 MMHG | SYSTOLIC BLOOD PRESSURE: 127 MMHG

## 2021-06-17 DIAGNOSIS — K80.20: Primary | ICD-10-CM

## 2021-06-17 DIAGNOSIS — F17.210: ICD-10-CM

## 2021-06-17 LAB
ALBUMIN SERPL BCP-MCNC: 3.6 G/DL (ref 3.4–5)
ALP SERPL-CCNC: 72 U/L (ref 45–117)
ALT SERPL W P-5'-P-CCNC: 27 U/L (ref 12–78)
AST SERPL W P-5'-P-CCNC: 18 U/L (ref 15–37)
BUN BLD-MCNC: 14 MG/DL (ref 7–18)
GLUCOSE SERPLBLD-MCNC: 92 MG/DL (ref 74–106)
HCT VFR BLD CALC: 44.3 % (ref 39.6–49)
LIPASE SERPL-CCNC: 181 U/L (ref 73–393)
LYMPHOCYTES # SPEC AUTO: 2.2 K/UL (ref 0.7–4.9)
PMV BLD: 8.2 FL (ref 7.6–11.3)
POTASSIUM SERPL-SCNC: 4.3 MMOL/L (ref 3.5–5.1)
RBC # BLD: 5.38 M/UL (ref 4.33–5.43)

## 2021-06-17 PROCEDURE — 71275 CT ANGIOGRAPHY CHEST: CPT

## 2021-06-17 PROCEDURE — 74175 CTA ABDOMEN W/CONTRAST: CPT

## 2021-06-17 PROCEDURE — 80076 HEPATIC FUNCTION PANEL: CPT

## 2021-06-17 PROCEDURE — 36415 COLL VENOUS BLD VENIPUNCTURE: CPT

## 2021-06-17 PROCEDURE — 80048 BASIC METABOLIC PNL TOTAL CA: CPT

## 2021-06-17 PROCEDURE — 83690 ASSAY OF LIPASE: CPT

## 2021-06-17 PROCEDURE — 85025 COMPLETE CBC W/AUTO DIFF WBC: CPT

## 2021-06-17 NOTE — EDPHYS
Physician Documentation                                                                           

 [facility]                                                                 

Name: David Chery Sr                                                                              

Age: 35 yrs                                                                                       

Sex: Male                                                                                         

: 1985                                                                                   

MRN: [id number]                                                                                   

Arrival Date: 2021                                                                          

Time: 01:05                                                                                       

Account#: [id number]                                                                            

Bed 13                                                                                            

Private MD:                                                                                       

ED Physician Nicolás Funk                                                                         

HPI:                                                                                              

                                                                                             

01:42 This 35 yrs old  Male presents to ER via Ambulatory with complaints of Back    rn  

      Pain.                                                                                       

01:42 The patient presents with pain that is acute, with no known mechanism of injury. The    rn  

      symptoms are located in the left subscapular area and right subscapular area. Onset:        

      The symptoms/episode began/occurred yesterday. The pain does not radiate. Associated        

      signs and symptoms: Pertinent negatives: abdominal pain, chest pain, fever, numbness,       

      tingling, urinary retention, vomiting, weakness. The problem was sustained without          

      known cause. Modifying factors: The patient symptoms are alleviated by remaining still,     

      the patient symptoms are aggravated by any movement. Severity of symptoms: At their         

      worst the symptoms were moderate, in the emergency department the symptoms are              

      unchanged. The patient has not experienced similar symptoms in the past. Reports mid        

      back pain, started yesterday, no known trauma, reports has been sick recently along         

      with kids, with cough, no hemoptysis. Reports never has had pain like this before. .        

                                                                                                  

Historical:                                                                                       

- Allergies:                                                                                      

01:26 No Known Allergies;                                                                     em  

- PMHx:                                                                                           

: None;                                                                                   em  

- PSHx:                                                                                           

: None;                                                                                   em  

                                                                                                  

- Immunization history:: Adult Immunizations up to date.                                          

- Social history:: Smoking status: Patient reports the use of cigarette tobacco                   

  products, smokes one pack cigarettes per day.                                                   

- Family history:: not pertinent.                                                                 

- Hospitalizations: : No recent hospitalization is reported.                                      

                                                                                                  

                                                                                                  

ROS:                                                                                              

01:42 Constitutional: Negative for fever, chills, and weight loss, Eyes: Negative for injury, rn  

      pain, redness, and discharge, Neck: Negative for injury, pain, and swelling,                

      Cardiovascular: Negative for chest pain, palpitations, and edema, Respiratory: Negative     

      for shortness of breath, cough, wheezing, and pleuritic chest pain, Abdomen/GI:             

      Negative for abdominal pain, nausea, vomiting, diarrhea, and constipation, Back:            

      Negative for injury  : Negative for injury, bleeding, discharge, and swelling,            

      MS/Extremity: Negative for injury and deformity, Skin: Negative for injury, rash, and       

      discoloration, Neuro: Negative for headache, weakness, numbness, tingling, and seizure.     

                                                                                                  

Exam:                                                                                             

01:42 Constitutional:  This is a well developed, well nourished patient who is awake, alert,  rn  

      appears uncomfortable Head/Face:  Normocephalic, atraumatic. Eyes:  Periorbital areas       

      with no swelling, redness, or edema. Cardiovascular:  Tachycardic, regular.  No pulse       

      deficits. Respiratory:  No increased work of breathing, no retractions or nasal             

      flaring. Abdomen/GI:  soft, non-tender Skin:  Warm, dry MS/ Extremity:  Pulses equal,       

      no cyanosis.  Neuro:  Awake and alert, GCS 15                                               

                                                                                                  

Vital Signs:                                                                                      

01:23  / 101; Pulse 102; Resp 18; Temp 97.7; Pulse Ox 100% on R/A; Weight 90.72 kg;     em  

      Height 5 ft. 10 in. (177.80 cm); Pain 10/10;                                                

02:02  / 103; Pulse 96; Resp 18 S; Pulse Ox 95% on R/A;                                 ad5 

03:28 Pulse 67; Resp 16 S; Pulse Ox 95% on R/A;                                               ad5 

04:59  / 95; Pulse 61; Resp 16 S; Pulse Ox 95% on R/A;                                  ad5 

01:23 Body Mass Index 28.70 (90.72 kg, 177.80 cm)                                             em  

                                                                                                  

MDM:                                                                                              

01:15 Patient medically screened.                                                             rn  

05:16 Differential diagnosis: Cholelithiasis pancreatitis, muscle spasm of back. Data         rn  

      reviewed: vital signs, nurses notes, lab test result(s), radiologic studies, CT scan,       

      and as a result, I will discharge patient. Counseling: I had a detailed discussion with     

      the patient and/or guardian regarding: the historical points, exam findings, and any        

      diagnostic results supporting the discharge/admit diagnosis, lab results, radiology         

      results, the need for outpatient follow up, to return to the emergency department if        

      symptoms worsen or persist or if there are any questions or concerns that arise at          

      home. Response to treatment: the patient's symptoms have markedly improved after            

      treatment, and as a result, I will discharge patient. Special discussion: I discussed       

      with the patient/guardian in detail that at this point there is no indication for           

      admission to the hospital. It is understood, however, that if the symptoms persist or       

      worsen the patient needs to return immediately for re-evaluation. ED course: No acute       

      findings of back pain found, seems more muscular, ct aorta negative, BP improved with       

      pain medication, incidental gallstone found but lfts normal and lipase normal. No abd       

      pain or tenderness, will dc home with surgery f/u as needed. .                              

                                                                                                  

                                                                                             

01:27 Order name: CBC with Diff                                                               rn  

                                                                                             

01:27 Order name: Basic Metabolic Panel; Complete Time: 05:16                                 rn  

                                                                                             

01:28 Order name: CBC with Automated Diff; Complete Time: 03:53                               EDMS

                                                                                             

04:27 Order name: LAB Add On                                                                  eb  

                                                                                             

04:32 Order name: Liver (Hepatic) Function; Complete Time: 05:16                              EDMS

                                                                                             

04:32 Order name: Lipase; Complete Time: 05:16                                                EDMS

                                                                                             

01:27 Order name: IV Start; Complete Time: 01:56                                              rn  

                                                                                             

01:27 Order name: CT Aorta for Dissection                                                     rn  

                                                                                                  

Administered Medications:                                                                         

01:56 Drug: Demerol (meperidine) 50 mg {Note: RASS 0.} Route: IVP; Site: right antecubital;   ad5 

02:32 Follow up: Response: No adverse reaction; Pain is decreased; RASS: Alert and Calm (0)   ad5 

                                                                                                  

                                                                                                  

Disposition:                                                                                      

21 05:19 Discharged to Home. Impression: Low back pain, Cholelithiasis.                     

- Condition is Stable.                                                                            

- Discharge Instructions: Back Pain, Adult, Food Choices for Gastroesophageal Reflux              

  Disease, Adult, Gastroesophageal Reflux Disease, Adult, Cholelithiasis.                         

                                                                                                  

- Medication Reconciliation Form, Thank You Letter, Antibiotic Education, Prescription            

  Opioid Use form.                                                                                

- Follow up: Private Physician; When: As needed; Reason: Recheck today's complaints,              

  Re-evaluation by your physician.                                                                

- Problem is new.                                                                                 

- Symptoms have improved.                                                                         

                                                                                                  

                                                                                                  

                                                                                                  

Signatures:                                                                                       

Dispatcher [employer]                           EDChandrakant Mendoza RN RN em Nieto, Roman, MD MD rn Davidson, Andrea                             ad5                                                  

                                                                                                  

Corrections: (The following items were deleted from the chart)                                    

05:31 05:19 2021 05:19 Discharged to Home. Impression: Low back pain; Cholelithiasis.   ad5 

      Condition is Stable. Forms are Medication Reconciliation Form, Thank You Letter,            

      Antibiotic Education, Prescription Opioid Use. Follow up: Private Physician; When: As       

      needed; Reason: Recheck today's complaints, Re-evaluation by your physician. Problem is     

      new. Symptoms have improved. rn                                                             

                                                                                                  

**************************************************************************************************

## 2021-06-17 NOTE — ER
Nurse's Notes                                                                                     

 [facility]                                                                 

Name: David Chery Sr                                                                              

Age: 35 yrs                                                                                       

Sex: Male                                                                                         

: 1985                                                                                   

MRN: [id number]                                                                                   

Arrival Date: 2021                                                                          

Time: 01:05                                                                                       

Account#: [id number]                                                                            

Bed 13                                                                                            

Private MD:                                                                                       

Diagnosis: Low back pain;Cholelithiasis                                                           

                                                                                                  

Presentation:                                                                                     

                                                                                             

01:23 Chief complaint: Patient states: sore throat and back pain since today, denies burning  em  

      with urination. Coronavirus screen: Client denies travel out of the [place] in the last 14     

      days. Ebola Screen: Patient negative for fever greater than or equal to 101.5 degrees       

      Fahrenheit, and additional compatible Ebola Virus Disease symptoms Patient denies           

      exposure to infectious person. Patient denies travel to an Ebola-affected area in the       

      21 days before illness onset. No symptoms or risks identified at this time. Initial         

      Sepsis Screen: Does the patient meet any 2 criteria? No. Patient's initial sepsis           

      screen is negative. Does the patient have a suspected source of infection? No.              

      Patient's initial sepsis screen is negative. Risk Assessment: Do you want to hurt           

      yourself or someone else? Patient reports no desire to harm self or others. Onset of        

      symptoms was 2021.                                                                 

:23 Method Of Arrival: Ambulatory                                                           em  

01:23 Acuity: CHLOE 3                                                                           em  

                                                                                                  

Historical:                                                                                       

- Allergies:                                                                                      

: No Known Allergies;                                                                     em  

- PMHx:                                                                                           

: None;                                                                                   em  

- PSHx:                                                                                           

: None;                                                                                   em  

                                                                                                  

- Immunization history:: Adult Immunizations up to date.                                          

- Social history:: Smoking status: Patient reports the use of cigarette tobacco                   

  products, smokes one pack cigarettes per day.                                                   

- Family history:: not pertinent.                                                                 

- Hospitalizations: : No recent hospitalization is reported.                                      

                                                                                                  

                                                                                                  

Screenin:23 Abuse screen: Denies threats or abuse. Nutritional screening: No deficits noted.        em  

      Tuberculosis screening: No symptoms or risk factors identified. Fall Risk None              

      identified.                                                                                 

                                                                                                  

Assessment:                                                                                       

01:57 General: Appears uncomfortable, Behavior is calm, cooperative, appropriate for age.     ad5 

      Pain: Complains of pain in back and chest. Neuro: Level of Consciousness is awake,          

      alert, obeys commands, Oriented to person, place, time, situation, Appropriate for age.     

      Cardiovascular: Heart tones S1 S2 present Capillary refill < 3 seconds Patient's skin       

      is warm and dry. Respiratory: Reports shortness of breath cough that is pain with cough     

      pain with movement pain with respiration Airway is patent Respiratory effort is even,       

      unlabored, Respiratory pattern is regular, symmetrical. GI: No deficits noted. No signs     

      and/or symptoms were reported involving the gastrointestinal system. : No deficits        

      noted. No signs and/or symptoms were reported regarding the genitourinary system. EENT:     

      Reports sore throat. Derm: Skin is pink, warm \T\ dry.                                      

01:57 Respiratory: Breath sounds are clear bilaterally.                                       ad5 

03:00 Reassessment: Patient appears in no apparent distress at this time. Patient and/or      ad5 

      family updated on plan of care and expected duration. Pain level reassessed. Patient is     

      alert, oriented x 3, equal unlabored respirations, skin warm/dry/pink. Patient states       

      symptoms have improved.                                                                     

04:30 Reassessment: Patient appears in no apparent distress at this time. No changes from     ad5 

      previously documented assessment. Patient and/or family updated on plan of care and         

      expected duration. Pain level reassessed.                                                   

                                                                                                  

Vital Signs:                                                                                      

01:23  / 101; Pulse 102; Resp 18; Temp 97.7; Pulse Ox 100% on R/A; Weight 90.72 kg;     em  

      Height 5 ft. 10 in. (177.80 cm); Pain 10/10;                                                

02:02  / 103; Pulse 96; Resp 18 S; Pulse Ox 95% on R/A;                                 ad5 

03:28 Pulse 67; Resp 16 S; Pulse Ox 95% on R/A;                                               ad5 

04:59  / 95; Pulse 61; Resp 16 S; Pulse Ox 95% on R/A;                                  ad5 

01:23 Body Mass Index 28.70 (90.72 kg, 177.80 cm)                                             em  

                                                                                                  

ED Course:                                                                                        

01:05 Patient arrived in ED.                                                                  bp1 

01:15 Nicolás Funk MD is Attending Physician.                                                rn  

01:22 Seng Layton is Primary Nurse.                                                      ad5 

01:23 Patient has correct armband on for positive identification. Pulse ox on. NIBP on.       em  

01:26 Triage completed.                                                                       em  

01:26 Arm band placed on.                                                                     em  

02:00 Door closed. Noise minimized. Warm blanket given. Head of bed lowered.                  ad5 

02:00 No provider procedures requiring assistance completed. Initial lab(s) drawn, by me,     ad5 

      sent to lab. Inserted saline lock: 20 gauge in right antecubital area, using aseptic        

      technique. Blood collected.                                                                 

02:55 CT Aorta for Dissection Sent.                                                           ad5 

03:04 CT Aorta for Dissection In Process Unspecified.                                         EDMS

05:30 IV discontinued, intact, bleeding controlled, No redness/swelling at site. Pressure     ad5 

      dressing applied.                                                                           

                                                                                                  

Administered Medications:                                                                         

01:56 Drug: Demerol (meperidine) 50 mg {Note: RASS 0.} Route: IVP; Site: right antecubital;   ad5 

02:32 Follow up: Response: No adverse reaction; Pain is decreased; RASS: Alert and Calm (0)   ad5 

                                                                                                  

                                                                                                  

Outcome:                                                                                          

05:19 Discharge ordered by MD.                                                                rn  

05:30 Discharged to home ambulatory.                                                          ad5 

05:30 Condition: stable                                                                           

05:30 Discharge instructions given to patient, Instructed on discharge instructions, follow       

      up and referral plans. Demonstrated understanding of instructions, follow-up care.          

05:31 Patient left the ED.                                                                    ad5 

                                                                                                  

Signatures:                                                                                       

Dispatcher [employer]                           EDMS                                                 

Chandrakant Estrada RN RN em Nieto, Roman, MD MD rn Paniauga, Brittany bp1 Davidson, Andrea                             ad5                                                  

                                                                                                  

Corrections: (The following items were deleted from the chart)                                    

01:28 01:23 Acuity: CHLOE 4 em                                                                  em  

02:00 01:57 Respiratory: Reports shortness of breath cough that is pain with cough pain with  ad5 

      respiration Airway is patent Respiratory effort is even, unlabored, Respiratory pattern     

      is regular, symmetrical, ad5                                                                

05:09 04:59 Pulse 61bpm; Resp 16bpm; Spontaneous; Pulse Ox 95% RA; ad5                        ad5 

                                                                                                  

**************************************************************************************************

## 2021-06-17 NOTE — RAD REPORT
EXAM DESCRIPTION:  CT - Angio  Aorta For Dissection - 6/17/2021 4:26 am

 

CLINICAL HISTORY:  The patient is 35 years old and is Male; back pain, hypertension

 

TECHNIQUE:  Axial computed tomographic angiography images of the chest, abdomen and pelvis with intra
venous contrast.   Sagittal and coronal reformatted images were created and reviewed.   This CT exam 
was performed using one or more of the following dose reduction techniques:   automated exposure cont
rol, adjustment of the mA and/or kV according to patient size, and/or use of iterative reconstruction
 technique.   MIP reconstructed images were created and reviewed.

 

COMPARISON:  No relevant prior studies available.

 

FINDINGS:  VASCULATURE:

   Aorta:   No aortic aneurysm or dissection.

   Pulmonary arteries:   No PE identified.

   Great vessels of aortic arch:   No acute findings.   No dissection.   No arterial occlusion or sig
nificant stenosis.

   Celiac trunk and mesenteric arteries:   No acute findings.   No occlusion or significant stenosis.


   Renal arteries:   Two right renal arteries. Two left renal arteries.

         No occlusion or significant stenosis.

   Iliac arteries:   No acute findings.   No occlusion or significant stenosis.

CHEST:

   Lungs:   Dependent changes in the bilateral lower lobes.

   Pleural space:   No pleural effusion or pneumothorax.

   Heart:   Unremarkable.   No cardiomegaly.   No significant pericardial effusion.

ABDOMEN:

   Liver:   Enlarged fatty liver.

   Gallbladder and bile ducts:   Distended gallbladder with 2 cm calcified stone at the gallbladder n
iman.

         No ductal dilation.

   Pancreas:   Unremarkable.   No ductal dilation.   No mass.

   Spleen:   Unremarkable.   No splenomegaly.

   Adrenals:   Unremarkable.   No mass.

   Kidneys and ureters:   Unremarkable.   No hydronephrosis.   No solid mass.

   Stomach and bowel:   No bowel dilatation or obstruction. No bowel wall thickening.

PELVIS:

   Appendix:   The visualized appendix is normal. No pericecal inflammation to suggest acute appendic
itis.

   Bladder:   Unremarkable.   No mass.

   Reproductive:   Unremarkable as visualized.

CHEST, ABDOMEN and PELVIS:

   Intraperitoneal space:   Unremarkable.   No significant fluid collection.   No free air.

   Bones/joints:   No acute fracture visualized.

         No dislocation.

   Soft tissues:   Unremarkable.

   Lymph nodes:   Unremarkable.   No pathologically enlarged lymph nodes.

 

IMPRESSION:  1.   No aortic aneurysm or dissection.

2.   Distended gallbladder with 2 cm calcified stone at the gallbladder neck.

3.   Enlarged fatty liver.

 

Electronically signed by:   Afua García MD   6/17/2021 4:12 AM CDT Workstation: [id number]

 

 

Due to temporary technical issues with the PACS/Fluency reporting system, reports are being signed by
 the in house radiologist without review as a courtesy to ensure prompt reporting. The interpreting r
adiologist is fully responsible for the content of the report.

## 2021-07-08 LAB
ALBUMIN SERPL BCP-MCNC: 3.8 G/DL (ref 3.4–5)
ALP SERPL-CCNC: 75 U/L (ref 45–117)
ALT SERPL W P-5'-P-CCNC: 29 U/L (ref 12–78)
AMYLASE SERPL-CCNC: 57 U/L (ref 25–115)
AST SERPL W P-5'-P-CCNC: 12 U/L (ref 15–37)
BUN BLD-MCNC: 18 MG/DL (ref 7–18)
GLUCOSE SERPLBLD-MCNC: 95 MG/DL (ref 74–106)
HCT VFR BLD CALC: 46.1 % (ref 39.6–49)
LIPASE SERPL-CCNC: 103 U/L (ref 73–393)
LYMPHOCYTES # SPEC AUTO: 2.7 K/UL (ref 0.7–4.9)
PMV BLD: 7.8 FL (ref 7.6–11.3)
POTASSIUM SERPL-SCNC: 4.3 MMOL/L (ref 3.5–5.1)
RBC # BLD: 5.58 M/UL (ref 4.33–5.43)

## 2021-07-08 NOTE — RAD REPORT
EXAM DESCRIPTION:  RAD - Chest Pa And Lat (2 Views) - 7/8/2021 1:43 pm

 

CLINICAL HISTORY:  pre op

Chest pain.

 

COMPARISON:  CHEST SINGLE VIEW dated 6/20/2014

 

FINDINGS:  The lungs are clear. The heart is normal in size. No displaced fractures.

 

IMPRESSION:  No acute or concerning finding suspected.

## 2021-07-09 ENCOUNTER — HOSPITAL ENCOUNTER (OUTPATIENT)
Dept: HOSPITAL 97 - OR | Age: 36
Discharge: HOME | End: 2021-07-09
Attending: SURGERY
Payer: COMMERCIAL

## 2021-07-09 VITALS — SYSTOLIC BLOOD PRESSURE: 124 MMHG | OXYGEN SATURATION: 95 % | TEMPERATURE: 97.6 F | DIASTOLIC BLOOD PRESSURE: 83 MMHG

## 2021-07-09 DIAGNOSIS — K80.10: Primary | ICD-10-CM

## 2021-07-09 PROCEDURE — 88304 TISSUE EXAM BY PATHOLOGIST: CPT

## 2021-07-09 PROCEDURE — 0FT44ZZ RESECTION OF GALLBLADDER, PERCUTANEOUS ENDOSCOPIC APPROACH: ICD-10-PCS

## 2021-07-09 PROCEDURE — 36415 COLL VENOUS BLD VENIPUNCTURE: CPT

## 2021-07-09 PROCEDURE — 71046 X-RAY EXAM CHEST 2 VIEWS: CPT

## 2021-07-09 PROCEDURE — 83690 ASSAY OF LIPASE: CPT

## 2021-07-09 PROCEDURE — 80076 HEPATIC FUNCTION PANEL: CPT

## 2021-07-09 PROCEDURE — 82150 ASSAY OF AMYLASE: CPT

## 2021-07-09 PROCEDURE — 47562 LAPAROSCOPIC CHOLECYSTECTOMY: CPT

## 2021-07-09 PROCEDURE — 80048 BASIC METABOLIC PNL TOTAL CA: CPT

## 2021-07-09 PROCEDURE — 85025 COMPLETE CBC W/AUTO DIFF WBC: CPT

## 2021-07-09 RX ADMIN — CEFOXITIN SODIUM ONE MLS: 1 INJECTION, SOLUTION INTRAVENOUS at 07:33

## 2021-07-09 RX ADMIN — CEFOXITIN SODIUM ONE MLS: 1 INJECTION, SOLUTION INTRAVENOUS at 08:05

## 2021-07-09 NOTE — P.BOP
Preoperative diagnosis: acute cholecystitis, symptomatic cholelithiasis


Postoperative diagnosis: same


Primary procedure: Laparoscopic cholecystectomy


Estimated blood loss: <10cc


Specimen: gb


Findings: as above


Anesthesia: General


Complications: None


Transferred to: Recovery Room


Condition: Good
